# Patient Record
Sex: MALE | Race: BLACK OR AFRICAN AMERICAN | NOT HISPANIC OR LATINO | Employment: UNEMPLOYED | URBAN - METROPOLITAN AREA
[De-identification: names, ages, dates, MRNs, and addresses within clinical notes are randomized per-mention and may not be internally consistent; named-entity substitution may affect disease eponyms.]

---

## 2021-02-11 ENCOUNTER — OFFICE VISIT (OUTPATIENT)
Dept: FAMILY MEDICINE | Facility: CLINIC | Age: 21
End: 2021-02-11

## 2021-02-11 VITALS
DIASTOLIC BLOOD PRESSURE: 84 MMHG | TEMPERATURE: 97.6 F | WEIGHT: 145 LBS | HEIGHT: 71 IN | BODY MASS INDEX: 20.3 KG/M2 | HEART RATE: 66 BPM | SYSTOLIC BLOOD PRESSURE: 122 MMHG | OXYGEN SATURATION: 99 %

## 2021-02-11 DIAGNOSIS — Z00.00 ROUTINE GENERAL MEDICAL EXAMINATION AT A HEALTH CARE FACILITY: Primary | ICD-10-CM

## 2021-02-11 DIAGNOSIS — L29.9 ITCHING: ICD-10-CM

## 2021-02-11 DIAGNOSIS — R10.9 ABDOMINAL PAIN, UNSPECIFIED ABDOMINAL LOCATION: ICD-10-CM

## 2021-02-11 DIAGNOSIS — A04.8 H. PYLORI INFECTION: ICD-10-CM

## 2021-02-11 LAB
ALBUMIN SERPL-MCNC: 4.1 G/DL (ref 3.4–5)
ALP SERPL-CCNC: 49 U/L (ref 65–260)
ALT SERPL W P-5'-P-CCNC: 20 U/L (ref 0–50)
ANION GAP SERPL CALCULATED.3IONS-SCNC: 5 MMOL/L (ref 3–14)
AST SERPL W P-5'-P-CCNC: 14 U/L (ref 0–35)
BASOPHILS # BLD AUTO: 0 10E9/L (ref 0–0.2)
BASOPHILS NFR BLD AUTO: 0.2 %
BILIRUB SERPL-MCNC: 0.8 MG/DL (ref 0.2–1.3)
BUN SERPL-MCNC: 8 MG/DL (ref 7–30)
CALCIUM SERPL-MCNC: 9.5 MG/DL (ref 8.5–10.1)
CHLORIDE SERPL-SCNC: 104 MMOL/L (ref 98–110)
CO2 SERPL-SCNC: 31 MMOL/L (ref 20–32)
CREAT SERPL-MCNC: 0.99 MG/DL (ref 0.5–1)
DIFFERENTIAL METHOD BLD: NORMAL
EOSINOPHIL # BLD AUTO: 0.1 10E9/L (ref 0–0.7)
EOSINOPHIL NFR BLD AUTO: 1.1 %
ERYTHROCYTE [DISTWIDTH] IN BLOOD BY AUTOMATED COUNT: 13.2 % (ref 10–15)
GFR SERPL CREATININE-BSD FRML MDRD: >90 ML/MIN/{1.73_M2}
GLUCOSE SERPL-MCNC: 89 MG/DL (ref 70–99)
HCT VFR BLD AUTO: 46.2 % (ref 40–53)
HGB BLD-MCNC: 15.4 G/DL (ref 13.3–17.7)
LYMPHOCYTES # BLD AUTO: 2.5 10E9/L (ref 0.8–5.3)
LYMPHOCYTES NFR BLD AUTO: 46.1 %
MCH RBC QN AUTO: 29.9 PG (ref 26.5–33)
MCHC RBC AUTO-ENTMCNC: 33.3 G/DL (ref 31.5–36.5)
MCV RBC AUTO: 90 FL (ref 78–100)
MONOCYTES # BLD AUTO: 0.6 10E9/L (ref 0–1.3)
MONOCYTES NFR BLD AUTO: 10.2 %
NEUTROPHILS # BLD AUTO: 2.3 10E9/L (ref 1.6–8.3)
NEUTROPHILS NFR BLD AUTO: 42.4 %
PLATELET # BLD AUTO: 287 10E9/L (ref 150–450)
POTASSIUM SERPL-SCNC: 3.9 MMOL/L (ref 3.4–5.3)
PROT SERPL-MCNC: 8 G/DL (ref 6.8–8.8)
RBC # BLD AUTO: 5.15 10E12/L (ref 4.4–5.9)
SODIUM SERPL-SCNC: 140 MMOL/L (ref 133–144)
WBC # BLD AUTO: 5.5 10E9/L (ref 4–11)

## 2021-02-11 PROCEDURE — 80053 COMPREHEN METABOLIC PANEL: CPT | Performed by: FAMILY MEDICINE

## 2021-02-11 PROCEDURE — 86803 HEPATITIS C AB TEST: CPT | Performed by: FAMILY MEDICINE

## 2021-02-11 PROCEDURE — 87389 HIV-1 AG W/HIV-1&-2 AB AG IA: CPT | Performed by: FAMILY MEDICINE

## 2021-02-11 PROCEDURE — 99385 PREV VISIT NEW AGE 18-39: CPT | Performed by: FAMILY MEDICINE

## 2021-02-11 PROCEDURE — 36415 COLL VENOUS BLD VENIPUNCTURE: CPT | Performed by: FAMILY MEDICINE

## 2021-02-11 PROCEDURE — 99213 OFFICE O/P EST LOW 20 MIN: CPT | Mod: 25 | Performed by: FAMILY MEDICINE

## 2021-02-11 PROCEDURE — 87338 HPYLORI STOOL AG IA: CPT | Performed by: FAMILY MEDICINE

## 2021-02-11 PROCEDURE — 85025 COMPLETE CBC W/AUTO DIFF WBC: CPT | Performed by: FAMILY MEDICINE

## 2021-02-11 SDOH — HEALTH STABILITY: MENTAL HEALTH: HOW OFTEN DO YOU HAVE A DRINK CONTAINING ALCOHOL?: NEVER

## 2021-02-11 ASSESSMENT — ENCOUNTER SYMPTOMS
SHORTNESS OF BREATH: 0
SORE THROAT: 0
ARTHRALGIAS: 0
DIARRHEA: 0
CHILLS: 0
NERVOUS/ANXIOUS: 0
DIZZINESS: 0
MYALGIAS: 0
PALPITATIONS: 0
HEMATURIA: 0
FREQUENCY: 0
CONSTIPATION: 0
PARESTHESIAS: 1
HEMATOCHEZIA: 0
EYE PAIN: 0
JOINT SWELLING: 0
FEVER: 0
COUGH: 0
DYSURIA: 0
HEADACHES: 0
ABDOMINAL PAIN: 1
HEARTBURN: 0
WEAKNESS: 0
NAUSEA: 0

## 2021-02-11 ASSESSMENT — MIFFLIN-ST. JEOR: SCORE: 1694.85

## 2021-02-11 NOTE — PROGRESS NOTES
SUBJECTIVE:   CC: Jessica Esquivel is an 20 year old male who presents for preventative health visit.   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   He is here with is older brother   Concern :    - Abdominal pain intermittent , randomly  No diarrhea or constipation.   Located in mid abdomen to epigastric area.    described as burning pain     - Patient also reports Itching  Aggravated by warm tempaeratue , taking warm shower. No rash.     Wt Readings from Last 4 Encounters:   02/11/21 65.8 kg (145 lb) (34 %, Z= -0.40)*     * Growth percentiles are based on CDC (Boys, 2-20 Years) data.       Today's PHQ-2 Score:   PHQ-2 ( 1999 Pfizer) 2/11/2021   Q1: Little interest or pleasure in doing things 0   Q2: Feeling down, depressed or hopeless 0   PHQ-2 Score 0   Q1: Little interest or pleasure in doing things Not at all   Q2: Feeling down, depressed or hopeless Not at all   PHQ-2 Score 0       Abuse: Current or Past(Physical, Sexual or Emotional)- No  Do you feel safe in your environment? Yes    Social History     Tobacco Use     Smoking status: Never Smoker     Smokeless tobacco: Never Used   Substance Use Topics     Alcohol use: Never     Frequency: Never       Alcohol Use 2/11/2021   Prescreen: >3 drinks/day or >7 drinks/week? No     Last PSA: No results found for: PSA    Reviewed orders with patient. Reviewed health maintenance and updated orders accordingly - Yes  Lab work is in process  BP Readings  from Last 3 Encounters:   02/11/21 122/84    Wt Readings from Last 3 Encounters:   02/11/21 65.8 kg (145 lb) (34 %, Z= -0.40)*     * Growth percentiles are based on CDC (Boys, 2-20 Years) data.                 There is no problem list on file for this patient.    History reviewed. No pertinent surgical history.    Social History     Tobacco Use     Smoking status: Never Smoker     Smokeless tobacco: Never Used   Substance Use Topics     Alcohol use: Never     Frequency: Never     History reviewed. No pertinent family history.      Current Outpatient Medications   Medication Sig Dispense Refill     diphenhydrAMINE (BENADRYL) 25 MG tablet Take 1 tablet (25 mg) by mouth nightly as needed 30 tablet 1     omeprazole (PRILOSEC) 20 MG DR capsule Take 1 capsule (20 mg) by mouth daily 30 capsule 0     No Known Allergies  No lab results found.     Reviewed and updated as needed this visit by clinical staff  Tobacco  Allergies  Meds  Problems  Med Hx  Surg Hx  Fam Hx  Soc Hx          Reviewed and updated as needed this visit by Provider   Allergies  Meds  Problems  Med Hx  Surg Hx  Fam Hx         History reviewed. No pertinent past medical history.   History reviewed. No pertinent surgical history.    Review of Systems   Constitutional: Negative for chills and fever.   HENT: Negative for congestion, ear pain, hearing loss and sore throat.    Eyes: Negative for pain and visual disturbance.   Respiratory: Negative for cough and shortness of breath.    Cardiovascular: Negative for chest pain, palpitations and peripheral edema.   Gastrointestinal: Positive for abdominal pain. Negative for constipation, diarrhea, heartburn, hematochezia and nausea.   Genitourinary: Negative for discharge, dysuria, frequency, genital sores, hematuria, impotence and urgency.   Musculoskeletal: Negative for arthralgias, joint swelling and myalgias.   Skin: Positive for rash.   Neurological: Positive for paresthesias. Negative for  "dizziness, weakness and headaches.   Psychiatric/Behavioral: Negative for mood changes. The patient is not nervous/anxious.        OBJECTIVE:   /84   Pulse 66   Temp 97.6  F (36.4  C) (Tympanic)   Ht 1.803 m (5' 11\")   Wt 65.8 kg (145 lb)   SpO2 99%   BMI 20.22 kg/m      Physical Exam  Vitals signs and nursing note reviewed.   Constitutional:       General: He is not in acute distress.     Appearance: Normal appearance. He is not ill-appearing, toxic-appearing or diaphoretic.   HENT:      Head: Normocephalic and atraumatic.      Nose: Nose normal.   Neck:      Musculoskeletal: Normal range of motion and neck supple. No neck rigidity or muscular tenderness.   Cardiovascular:      Rate and Rhythm: Normal rate and regular rhythm.      Pulses: Normal pulses.      Heart sounds: Normal heart sounds. No murmur. No friction rub. No gallop.    Pulmonary:      Effort: Pulmonary effort is normal. No respiratory distress.      Breath sounds: Normal breath sounds. No stridor. No wheezing, rhonchi or rales.   Chest:      Chest wall: No tenderness.   Abdominal:      General: Abdomen is flat.   Genitourinary:     Penis: Normal.       Testes: Normal.   Musculoskeletal:         General: No swelling, tenderness, deformity or signs of injury.      Right lower leg: No edema.      Left lower leg: No edema.   Skin:     General: Skin is dry.      Capillary Refill: Capillary refill takes less than 2 seconds.      Coloration: Skin is not jaundiced or pale.      Findings: No bruising, erythema, lesion or rash.   Neurological:      General: No focal deficit present.      Mental Status: He is alert and oriented to person, place, and time.             ASSESSMENT/PLAN:   1. Routine general medical examination at a health care facility  Preventive care reviewed and updated     - HIV Antigen Antibody Combo  - Hepatitis C Screen Reflex to HCV RNA Quant and Genotype  - CBC with platelets and differential    2. Abdominal pain, unspecified " "abdominal location  Abdominal pain intermittent , randomly  No diarrhea or constipation.   Located in mid abdomen to epigastric area.    described as burning pain   I had a long discussion with the patient about his condition , diagnosis treatment options. We discussed diffenetial diagnosis, and expected course.  I suspect acid reflux . Will obtain Helicobacter pylori Antigen Stool, trial of PPI, advised patient start Omeprazole after obtaining the stool sample .  I recommend the following :    - Helicobacter pylori Antigen Stool; Future  - Comprehensive metabolic panel (BMP + Alb, Alk Phos, ALT, AST, Total. Bili, TP)  - omeprazole (PRILOSEC) 20 MG DR capsule; Take 1 capsule (20 mg) by mouth daily  Dispense: 30 capsule; Refill: 0    3. Itching  Unclear etiology   Could be related to dry skin   Advised to use skin moisturized   Benadryl prn   - diphenhydrAMINE (BENADRYL) 25 MG tablet; Take 1 tablet (25 mg) by mouth nightly as needed  Dispense: 30 tablet; Refill: 1    Patient has been advised of split billing requirements and indicates understanding: Yes  COUNSELING:   Reviewed preventive health counseling, as reflected in patient instructions       Consider Hep C screening for all patients one time for ages 18-79 years       HIV screeninx in teen years, 1x in adult years, and at intervals if high risk    Estimated body mass index is 20.22 kg/m  as calculated from the following:    Height as of this encounter: 1.803 m (5' 11\").    Weight as of this encounter: 65.8 kg (145 lb).         He reports that he has never smoked. He has never used smokeless tobacco.      Counseling Resources:  ATP IV Guidelines  Pooled Cohorts Equation Calculator  FRAX Risk Assessment  ICSI Preventive Guidelines  Dietary Guidelines for Americans, 2010  USDA's MyPlate  ASA Prophylaxis  Lung CA Screening    Deborah Herrera MD  [facility]  "

## 2021-02-12 LAB
H PYLORI AG STL QL IA: POSITIVE
HCV AB SERPL QL IA: NONREACTIVE
HIV 1+2 AB+HIV1 P24 AG SERPL QL IA: NONREACTIVE

## 2021-02-12 RX ORDER — DOXYCYCLINE HYCLATE 100 MG
100 TABLET ORAL 2 TIMES DAILY
Qty: 28 TABLET | Refills: 0 | Status: SHIPPED | OUTPATIENT
Start: 2021-02-12 | End: 2021-02-26

## 2021-02-12 RX ORDER — METRONIDAZOLE 500 MG/1
500 TABLET ORAL 2 TIMES DAILY
Qty: 28 TABLET | Refills: 0 | Status: SHIPPED | OUTPATIENT
Start: 2021-02-12 | End: 2021-02-26

## 2023-02-09 ENCOUNTER — OFFICE VISIT (OUTPATIENT)
Dept: FAMILY MEDICINE | Facility: CLINIC | Age: 23
End: 2023-02-09

## 2023-02-09 VITALS
HEART RATE: 65 BPM | TEMPERATURE: 96.6 F | WEIGHT: 144 LBS | OXYGEN SATURATION: 99 % | RESPIRATION RATE: 18 BRPM | SYSTOLIC BLOOD PRESSURE: 112 MMHG | HEIGHT: 71 IN | DIASTOLIC BLOOD PRESSURE: 77 MMHG | BODY MASS INDEX: 20.16 KG/M2

## 2023-02-09 DIAGNOSIS — Z00.00 ROUTINE GENERAL MEDICAL EXAMINATION AT A HEALTH CARE FACILITY: Primary | ICD-10-CM

## 2023-02-09 PROCEDURE — 99395 PREV VISIT EST AGE 18-39: CPT | Performed by: FAMILY MEDICINE

## 2023-02-09 ASSESSMENT — ENCOUNTER SYMPTOMS
PALPITATIONS: 0
SHORTNESS OF BREATH: 0
CONSTIPATION: 0
ARTHRALGIAS: 0
DIZZINESS: 0
PARESTHESIAS: 0
FREQUENCY: 0
HEMATURIA: 0
DIARRHEA: 0
HEMATOCHEZIA: 0
JOINT SWELLING: 0
CHILLS: 0
COUGH: 0
WEAKNESS: 0
ABDOMINAL PAIN: 0
HEADACHES: 0
EYE PAIN: 0
NERVOUS/ANXIOUS: 0
FEVER: 0
NAUSEA: 0
HEARTBURN: 0
DYSURIA: 0
MYALGIAS: 1
SORE THROAT: 0

## 2023-02-09 ASSESSMENT — PAIN SCALES - GENERAL: PAINLEVEL: NO PAIN (0)

## 2023-02-09 NOTE — PROGRESS NOTES
SUBJECTIVE:   CC: eJssica is an 22 year old who presents for preventative health visit.   Patient has been advised of split billing requirements and indicates understanding: Yes  Healthy Habits:     Getting at least 3 servings of Calcium per day:  Yes    Bi-annual eye exam:  NO    Dental care twice a year:  NO    Sleep apnea or symptoms of sleep apnea:  None    Diet:  Regular (no restrictions)    Frequency of exercise:  1 day/week    Duration of exercise:  30-45 minutes    Taking medications regularly:  Yes    Medication side effects:  None    PHQ-2 Total Score: 0    Additional concerns today:  No        -  Preventive -     Immunization History   Administered Date(s) Administered     COVID-19 Vaccine 12+ (Pfizer) 05/18/2021, 06/08/2021     - Colon CA screen: Colonoscopy, age 45-75 every 10 years or FIT every year or Cologuard every 3 years   No family hx of colon cancer         Today's PHQ-2 Score:   PHQ-2 ( 1999 Pfizer) 2/9/2023   Q1: Little interest or pleasure in doing things 0   Q2: Feeling down, depressed or hopeless 0   PHQ-2 Score 0   PHQ-2 Total Score (12-17 Years)- Positive if 3 or more points; Administer PHQ-A if positive -   Q1: Little interest or pleasure in doing things Not at all   Q2: Feeling down, depressed or hopeless Not at all   PHQ-2 Score 0       Have you ever done Advance Care Planning? (For example, a Health Directive, POLST, or a discussion with a medical provider or your loved ones about your wishes): No, advance care planning information given to patient to review.  Patient declined advance care planning discussion at this time.    Social History     Tobacco Use     Smoking status: Never     Smokeless tobacco: Never   Substance Use Topics     Alcohol use: Never     If you drink alcohol do you typically have >3 drinks per day or >7 drinks per week? No    Alcohol Use 2/9/2023   Prescreen: >3 drinks/day or >7 drinks/week? No   Prescreen: >3 drinks/day or >7 drinks/week? -   No flowsheet data  found.    Last PSA: No results found for: PSA    Reviewed orders with patient. Reviewed health maintenance and updated orders accordingly - Yes  Labs reviewed in EPIC  BP Readings from Last 3 Encounters:   02/09/23 112/77   02/11/21 122/84    Wt Readings from Last 3 Encounters:   02/09/23 65.3 kg (144 lb)   02/11/21 65.8 kg (145 lb) (34 %, Z= -0.40)*     * Growth percentiles are based on [facility] (Boys, 2-20 Years) data.                  There is no problem list on file for this patient.    No past surgical history on file.    Social History     Tobacco Use     Smoking status: Never     Smokeless tobacco: Never   Substance Use Topics     Alcohol use: Never     No family history on file.      Current Outpatient Medications   Medication Sig Dispense Refill     diphenhydrAMINE (BENADRYL) 25 MG tablet Take 1 tablet (25 mg) by mouth nightly as needed (Patient not taking: Reported on 2/9/2023) 30 tablet 1     omeprazole (PRILOSEC) 20 MG DR capsule Take 1 capsule (20 mg) by mouth 2 times daily (Patient not taking: Reported on 2/9/2023) 60 capsule 1     No Known Allergies  Recent Labs   Lab Test 02/11/21  1140   ALT 20   CR 0.99   GFRESTIMATED >90   GFRESTBLACK >90   POTASSIUM 3.9        Reviewed and updated as needed this visit by clinical staff   Tobacco  Allergies  Meds              Reviewed and updated as needed this visit by Provider                 No past medical history on file.   No past surgical history on file.    Review of Systems   Constitutional: Negative for chills and fever.   HENT: Negative for congestion, ear pain, hearing loss and sore throat.    Eyes: Negative for pain and visual disturbance.   Respiratory: Negative for cough and shortness of breath.    Cardiovascular: Negative for chest pain, palpitations and peripheral edema.   Gastrointestinal: Negative for abdominal pain, constipation, diarrhea, heartburn, hematochezia and nausea.   Genitourinary: Negative for dysuria, frequency, genital sores,  "hematuria, impotence, penile discharge and urgency.   Musculoskeletal: Positive for myalgias. Negative for arthralgias and joint swelling.   Skin: Negative for rash.   Neurological: Negative for dizziness, weakness, headaches and paresthesias.   Psychiatric/Behavioral: Negative for mood changes. The patient is not nervous/anxious.          OBJECTIVE:   /77   Pulse 65   Temp (!) 96.6  F (35.9  C) (Tympanic)   Resp 18   Ht 1.803 m (5' 11\")   Wt 65.3 kg (144 lb)   SpO2 99%   BMI 20.08 kg/m      Physical Exam  GENERAL: healthy, alert and no distress  EYES: Eyes grossly normal to inspection, PERRL and conjunctivae and sclerae normal  HENT: ear canals and TM's normal, nose and mouth without ulcers or lesions  NECK: no adenopathy, no asymmetry, masses, or scars and thyroid normal to palpation  RESP: lungs clear to auscultation - no rales, rhonchi or wheezes  CV: regular rate and rhythm, normal S1 S2, no S3 or S4, no murmur, click or rub, no peripheral edema and peripheral pulses strong  ABDOMEN: soft, nontender, no hepatosplenomegaly, no masses and bowel sounds normal  MS: no gross musculoskeletal defects noted, no edema  SKIN: no suspicious lesions or rashes  NEURO: Normal strength and tone, mentation intact and speech normal  PSYCH: mentation appears normal, affect normal/bright        ASSESSMENT/PLAN:       ICD-10-CM    1. Routine general medical examination at a health care facility  Z00.00            -We discussed recommendation of  moderate intensity exercise /week   - We discussed the need for heart healthy diet including a diet rich in fruits, vegetables and fiber and very low on carbonated beverages sugar  - We discussed recommendation of moderation of alcohol, salt and NSAIDs.    Patient has been advised of split billing requirements and indicates understanding: Yes      COUNSELING:   Reviewed preventive health counseling, as reflected in patient instructions       Regular exercise       Healthy " diet/nutrition        He reports that he has never smoked. He has never used smokeless tobacco.        Deborah Herrera MD  [facility]